# Patient Record
Sex: MALE
[De-identification: names, ages, dates, MRNs, and addresses within clinical notes are randomized per-mention and may not be internally consistent; named-entity substitution may affect disease eponyms.]

---

## 2018-07-25 ENCOUNTER — HOSPITAL ENCOUNTER (EMERGENCY)
Dept: HOSPITAL 97 - ER | Age: 18
Discharge: ANOTHER HOSPITAL | End: 2018-07-25
Payer: COMMERCIAL

## 2018-07-25 VITALS — OXYGEN SATURATION: 95 % | DIASTOLIC BLOOD PRESSURE: 75 MMHG | SYSTOLIC BLOOD PRESSURE: 126 MMHG

## 2018-07-25 VITALS — TEMPERATURE: 97.9 F
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
## 2018-07-25 DIAGNOSIS — S37.29XA: Primary | ICD-10-CM

## 2018-07-25 DIAGNOSIS — S36.81XA: ICD-10-CM

## 2018-07-25 LAB
BUN BLD-MCNC: 19 MG/DL (ref 7–18)
GLUCOSE SERPLBLD-MCNC: 178 MG/DL (ref 74–106)
HCT VFR BLD CALC: 46.8 % (ref 39.6–49)
LYMPHOCYTES # SPEC AUTO: 3.6 K/UL (ref 0.7–4.9)
MCH RBC QN AUTO: 30.8 PG (ref 27–35)
MCV RBC: 88.8 FL (ref 80–100)
PMV BLD: 8 FL (ref 7.6–11.3)
POTASSIUM SERPL-SCNC: 2.9 MMOL/L (ref 3.5–5.1)
RBC # BLD: 5.27 M/UL (ref 4.33–5.43)
UA COMPLETE W REFLEX CULTURE PNL UR: (no result)
UA DIPSTICK W REFLEX MICRO PNL UR: (no result)

## 2018-07-25 PROCEDURE — 99285 EMERGENCY DEPT VISIT HI MDM: CPT

## 2018-07-25 PROCEDURE — 81003 URINALYSIS AUTO W/O SCOPE: CPT

## 2018-07-25 PROCEDURE — 87086 URINE CULTURE/COLONY COUNT: CPT

## 2018-07-25 PROCEDURE — 86900 BLOOD TYPING SEROLOGIC ABO: CPT

## 2018-07-25 PROCEDURE — 81015 MICROSCOPIC EXAM OF URINE: CPT

## 2018-07-25 PROCEDURE — 86850 RBC ANTIBODY SCREEN: CPT

## 2018-07-25 PROCEDURE — 36415 COLL VENOUS BLD VENIPUNCTURE: CPT

## 2018-07-25 PROCEDURE — 72193 CT PELVIS W/DYE: CPT

## 2018-07-25 PROCEDURE — 86901 BLOOD TYPING SEROLOGIC RH(D): CPT

## 2018-07-25 PROCEDURE — 72125 CT NECK SPINE W/O DYE: CPT

## 2018-07-25 PROCEDURE — 87088 URINE BACTERIA CULTURE: CPT

## 2018-07-25 PROCEDURE — 70450 CT HEAD/BRAIN W/O DYE: CPT

## 2018-07-25 PROCEDURE — 80048 BASIC METABOLIC PNL TOTAL CA: CPT

## 2018-07-25 PROCEDURE — 74177 CT ABD & PELVIS W/CONTRAST: CPT

## 2018-07-25 PROCEDURE — 96374 THER/PROPH/DIAG INJ IV PUSH: CPT

## 2018-07-25 PROCEDURE — 71045 X-RAY EXAM CHEST 1 VIEW: CPT

## 2018-07-25 PROCEDURE — 71260 CT THORAX DX C+: CPT

## 2018-07-25 PROCEDURE — 72170 X-RAY EXAM OF PELVIS: CPT

## 2018-07-25 PROCEDURE — 96375 TX/PRO/DX INJ NEW DRUG ADDON: CPT

## 2018-07-25 PROCEDURE — 85025 COMPLETE CBC W/AUTO DIFF WBC: CPT

## 2018-07-25 PROCEDURE — 51702 INSERT TEMP BLADDER CATH: CPT
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
## 2018-07-25 NOTE — RAD REPORT
EXAM DESCRIPTION:  CT - Pelvis W/Cont - 7/25/2018 9:56 am

 

CLINICAL HISTORY:  CYSTO FOR BLADDER TRAUMA

Trauma, bladder rupture.

 

COMPARISON:  Head C  Spine Cap W Con dated 7/25/2018

 

TECHNIQUE:  All CT scans are performed using dose optimization technique as appropriate and may inclu
de automated exposure control or mA/KV adjustment according to patient size.

 

300 cc of contrast was infused via Koenig catheter into the urinary bladder. Following this CT imaging
 through the pelvis was performed

 

FINDINGS:  The contrast material infused into the bladder is seen to fill the intraperitoneal space c
ompatible with intraperitoneal bladder rupture. No evidence of a pelvic fracture.

 

Koenig catheter is present in a decompressed urinary bladder.

 

IMPRESSION:  Intraperitoneal bladder rupture.

 

Findings were discussed with Dr. Saldaña in the emergency room 10:10 a.m. 07/25/2018 by telephone.